# Patient Record
Sex: FEMALE | Race: BLACK OR AFRICAN AMERICAN | Employment: FULL TIME | URBAN - METROPOLITAN AREA
[De-identification: names, ages, dates, MRNs, and addresses within clinical notes are randomized per-mention and may not be internally consistent; named-entity substitution may affect disease eponyms.]

---

## 2019-07-23 PROBLEM — D21.9 FIBROIDS: Status: ACTIVE | Noted: 2019-07-23

## 2019-07-23 PROBLEM — N92.6 IRREGULAR MENSES: Status: ACTIVE | Noted: 2019-07-23

## 2019-07-23 PROBLEM — Z90.710 S/P LAPAROSCOPIC HYSTERECTOMY: Status: ACTIVE | Noted: 2019-07-23

## 2021-01-04 ENCOUNTER — HOSPITAL ENCOUNTER (OUTPATIENT)
Dept: PHYSICAL THERAPY | Age: 56
Discharge: HOME | End: 2021-01-04
Payer: COMMERCIAL

## 2021-01-04 PROCEDURE — 97110 THERAPEUTIC EXERCISES: CPT

## 2021-01-04 PROCEDURE — 97161 PT EVAL LOW COMPLEX 20 MIN: CPT

## 2021-01-04 NOTE — PROGRESS NOTES
PHYSICAL THERAPY - DAILY TREATMENT NOTE    Patient Name: Angelika Grove        Date: 2021  : 1965   yes Patient  Verified  Visit #:   1   of   8  Insurance: Payor: Blanche Prabhakar / Plan: [address] PT / Product Type: Commerical /      In time: 5:00 Out time: 6:00   Total Treatment Time: 60     Medicare/[facility] Time Tracking (below)   Total Timed Codes (min):  na 1:1 Treatment Time:  na     TREATMENT AREA =  Lumbar spondylosis [M47.816]    SUBJECTIVE  Pain Level (on 0 to 10 scale): 2  / 10   Medication Changes/New allergies or changes in medical history, any new surgeries or procedures?    no  If yes, update Summary List   Subjective Functional Status/Changes:  []  No changes reported     See POC          OBJECTIVE    12 min Therapeutic Exercise:  [x]  See flow sheet   Rationale:      increase ROM, increase strength and increase proprioception to improve the patients ability to complete ADLs     Billed With/As:   [x] TE   [] TA   [] Neuro   [] Self Care Patient Education: [x] Review HEP    [] Progressed/Changed HEP based on:   [] positioning   [] body mechanics   [] transfers   [] heat/ice application    [] other:      Other Objective/Functional Measures:    See POC     Post Treatment Pain Level (on 0 to 10) scale:   2   10     ASSESSMENT  Assessment/Changes in Function:     See POC     []  See Progress Note/Recertification   Patient will continue to benefit from skilled PT services to modify and progress therapeutic interventions, address functional mobility deficits, address ROM deficits, address strength deficits, analyze and address soft tissue restrictions, analyze and cue movement patterns, analyze and modify body mechanics/ergonomics, assess and modify postural abnormalities and instruct in home and community integration to attain remaining goals.    Progress toward goals / Updated goals:    See POC     PLAN  []  Upgrade activities as tolerated yes Continue plan of care   []  Discharge due to :    []  Other:      Therapist: Angela Newton, PT    Date: 1/4/2021 Time: 5:03 PM     No future appointments.
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
## 2021-01-06 ENCOUNTER — HOSPITAL ENCOUNTER (OUTPATIENT)
Dept: PHYSICAL THERAPY | Age: 56
Discharge: HOME | End: 2021-01-06
Payer: COMMERCIAL

## 2021-01-06 PROCEDURE — 97110 THERAPEUTIC EXERCISES: CPT

## 2021-01-06 NOTE — PROGRESS NOTES
PHYSICAL THERAPY - DAILY TREATMENT NOTE    Patient Name: Darrian Stewart        Date: 2021  : 1965   yes Patient  Verified  Visit #:   2   of   8  Insurance: Payor: Lazara Daniel / Plan: [address] PT / Product Type: Commerical /      In time: 3:30 Out time: 4:25   Total Treatment Time: 55     Medicare/[employer] Time Tracking (below)   Total Timed Codes (min):  na 1:1 Treatment Time:  na     TREATMENT AREA =  Lumbar spondylosis [M47.816]    SUBJECTIVE  Pain Level (on 0 to 10 scale):  3  / 10   Medication Changes/New allergies or changes in medical history, any new surgeries or procedures?    no  If yes, update Summary List   Subjective Functional Status/Changes:  []  No changes reported     Pt reports soreness from IE and HEP but no inc in pain. OBJECTIVE  Modalities Rationale:     decrease pain and increase tissue extensibility to improve patient's ability to complete ADLs   min [] Estim, type/location:                                      []  att     []  unatt     []  w/US     []  w/ice    []  w/heat    min []  Mechanical Traction: type/lbs                   []  pro   []  sup   []  int   []  cont    []  before manual    []  after manual    min []  Ultrasound, settings/location:      min []  Iontophoresis w/ dexamethasone, location:                                               []  take home patch       []  in clinic   10 min []  Ice     [x]  Heat    location/position:  To the l/s in prone    min []  Vasopneumatic Device, press/temp:     min []  Other:    [x] Skin assessment post-treatment (if applicable):    [x]  intact    []  redness- no adverse reaction     []redness  adverse reaction:        45 min Therapeutic Exercise:  [x]  See flow sheet   Rationale:      increase ROM, increase strength and improve coordination to improve the patients ability to tolerate prolonged standing     Billed With/As:   [x] TE   [] TA   [] Neuro   [] Self Care Patient Education: [x] Review HEP    [] Progressed/Changed HEP based on:   [] positioning   [] body mechanics   [] transfers   [] heat/ice application    [] other:      Other Objective/Functional Measures:    100% verbal cueing and visual demo for new exercises per flow     Post Treatment Pain Level (on 0 to 10) scale:   2  / 10     ASSESSMENT  Assessment/Changes in Function:     Chief complaint t/o session R HS cramping w/ core stability exercise, likely related to compensatory muscle activation 2' weakness. Pt reports compliance to initial HEP and reports no inc in pain post tx session. []  See Progress Note/Recertification   Patient will continue to benefit from skilled PT services to modify and progress therapeutic interventions, address functional mobility deficits, address ROM deficits, address strength deficits, analyze and address soft tissue restrictions, analyze and cue movement patterns, analyze and modify body mechanics/ergonomics, assess and modify postural abnormalities and instruct in home and community integration to attain remaining goals. Progress toward goals / Updated goals: · Short Term Goals: To be accomplished in  1  weeks:  1. Pt will be I and compliant with HEP for self management of sx. 1/6/2021: goal in progress, reports compliance w/ initial HEP  2. Pt will perform supine bridge to 100% w/ proper form to improve transfers and bed mobility  · Long Term Goals: To be accomplished in 4  weeks:  1. Pt will perform quadruped UE/LE diagonals while maintaining neutral spine to improve core stability for ADLs  2. Pt will note max daily pain </= 5/10 to improve ability to stand for housework and cooking  3. Pt will demo I w/ long-term HEP for self management of sx  4.  Improve FOTO score to >/= 59/100 to indicate improved function       PLAN  []  Upgrade activities as tolerated yes Continue plan of care   []  Discharge due to :    []  Other:      Therapist: Luis Aguilar PT    Date: 1/6/2021 Time: 3:59 PM     Future Appointments   Date Time Provider Danielle Arianne   1/11/2021  4:00 PM Alley Allison [facility]   1/14/2021  4:15 PM Lili Wolf [address] [facility]   1/18/2021  4:00 PM Alley Allison [facility]   1/20/2021  5:15 PM Harlon Foots, PTA [facility]   1/25/2021  3:15 PM Harlon Foots, PTA [facility]   1/27/2021  3:00 PM Harlon Foots, PTA [facility]

## 2021-01-11 ENCOUNTER — APPOINTMENT (OUTPATIENT)
Dept: PHYSICAL THERAPY | Age: 56
End: 2021-01-11
Payer: COMMERCIAL

## 2021-01-14 ENCOUNTER — HOSPITAL ENCOUNTER (OUTPATIENT)
Dept: PHYSICAL THERAPY | Age: 56
Discharge: HOME | End: 2021-01-14
Payer: COMMERCIAL

## 2021-01-14 PROCEDURE — 97110 THERAPEUTIC EXERCISES: CPT

## 2021-01-14 NOTE — PROGRESS NOTES
PHYSICAL THERAPY - DAILY TREATMENT NOTE    Patient Name: Mariajose Barfield        Date: 2021  : 1965   yes Patient  Verified  Visit #:   3  of   8  Insurance: Payor: Jose Thompson / [address] / Product Type: Commerical /      In time: 415 Out time: 504   Total Treatment Time: 49     Medicare/[employer] Time Tracking (below)   Total Timed Codes (min):  na 1:1 Treatment Time:  na     TREATMENT AREA =  Lumbar spondylosis [M47.816]    SUBJECTIVE  Pain Level (on 0 to 10 scale):  2  / 10   Medication Changes/New allergies or changes in medical history, any new surgeries or procedures?    no  If yes, update Summary List   Subjective Functional Status/Changes:  []  No changes reported     Pt reports she felt fine after her last session, her back is a little less painful today. OBJECTIVE  Modalities Rationale:     decrease pain and increase tissue extensibility to improve patient's ability to complete ADLs   min [] Estim, type/location:                                      []  att     []  unatt     []  w/US     []  w/ice    []  w/heat    min []  Mechanical Traction: type/lbs                   []  pro   []  sup   []  int   []  cont    []  before manual    []  after manual    min []  Ultrasound, settings/location:      min []  Iontophoresis w/ dexamethasone, location:                                               []  take home patch       []  in clinic   10 min []  Ice     [x]  Heat    location/position:  To the l/s in prone    min []  Vasopneumatic Device, press/temp:     min []  Other:    [x] Skin assessment post-treatment (if applicable):    [x]  intact    []  redness- no adverse reaction     []redness - adverse reaction:        39 min Therapeutic Exercise:  [x]  See flow sheet   Rationale:      increase ROM, increase strength and improve coordination to improve the patients ability to tolerate prolonged standing     Billed With/As:   [x] TE   [] TA   [] Neuro   [] Self Care Patient Education: [x] Review HEP    [] Progressed/Changed HEP based on:   [] positioning   [] body mechanics   [] transfers   [] heat/ice application    [] other:      Other Objective/Functional Measures: Added quad multi lift     Post Treatment Pain Level (on 0 to 10) scale:   2  / 10     ASSESSMENT  Assessment/Changes in Function:     Pt was able to tolerate addition of multifidus lift without complaint or inc in sx. Pt required vc and demo to perform correctly as well as tactile cues to dec lateral shift when performing L lift. Pt required 100% vc with all exercises to perform correctly. PT considering adding paloff press and/or modified planks as tolerated. []  See Progress Note/Recertification   Patient will continue to benefit from skilled PT services to modify and progress therapeutic interventions, address functional mobility deficits, address ROM deficits, address strength deficits, analyze and address soft tissue restrictions, analyze and cue movement patterns, analyze and modify body mechanics/ergonomics, assess and modify postural abnormalities and instruct in home and community integration to attain remaining goals. Progress toward goals / Updated goals: · Short Term Goals: To be accomplished in  1  weeks:  1. Pt will be I and compliant with HEP for self management of sx. 1/6/2021: goal in progress, reports compliance w/ initial HEP  2. Pt will perform supine bridge to 100% w/ proper form to improve transfers and bed mobility  · Long Term Goals: To be accomplished in 4  weeks:  1. Pt will perform quadruped UE/LE diagonals while maintaining neutral spine to improve core stability for ADLs added quad multi lift today 1/14/21  2. Pt will note max daily pain </= 5/10 to improve ability to stand for housework and cooking  3. Pt will demo I w/ long-term HEP for self management of sx  4.  Improve FOTO score to >/= 59/100 to indicate improved function       PLAN  []  Upgrade activities as tolerated yes Continue plan of care   []  Discharge due to :    []  Other:      Therapist: Lin Garcia    Date: 1/14/2021 Time: 3:59 PM     Future Appointments   Date Time Provider Danielle Acuña   1/14/2021  4:15 PM Margo Orris [facility]   1/18/2021  4:00 PM Walterine Mix, PTA [facility]   1/20/2021  5:15 PM Walterine Mix, PTA [facility]   1/25/2021  3:15 PM Walterine Mix, PTA [facility]   1/27/2021  3:00 PM Walterine Mix, PTA [facility]

## 2021-01-18 ENCOUNTER — APPOINTMENT (OUTPATIENT)
Dept: PHYSICAL THERAPY | Age: 56
End: 2021-01-18
Payer: COMMERCIAL

## 2021-01-20 ENCOUNTER — APPOINTMENT (OUTPATIENT)
Dept: PHYSICAL THERAPY | Age: 56
End: 2021-01-20
Payer: COMMERCIAL

## 2021-01-25 ENCOUNTER — APPOINTMENT (OUTPATIENT)
Dept: PHYSICAL THERAPY | Age: 56
End: 2021-01-25
Payer: COMMERCIAL

## 2021-01-27 ENCOUNTER — APPOINTMENT (OUTPATIENT)
Dept: PHYSICAL THERAPY | Age: 56
End: 2021-01-27
Payer: COMMERCIAL

## 2021-03-11 NOTE — PROGRESS NOTES
[address] [facility] AT [address], [address] [place] [address]  Phone: [phone]   [phone]    DISCHARGE NOTE  Patient Name: Flavio Chaparro : 1965   Treatment/Medical Diagnosis: Lumbar spondylosis [P60.955]   Referral Source: Eris Ford MD     Date of Initial Visit: 2021 Attended Visits: 3 Missed Visits: 2       SUMMARY OF TREATMENT  Pt attended initial evaluation and 2 follow-up appointments. Unfortunately, patient failed to return for further treatment and is therefore discharged from our care at this time. A formal reassessment of goals was unable to be performed due to unplanned DC. RECOMMENDATIONS  Discontinue physical therapy due to patient not returning. If you have any questions/comments please contact us directly at [phone]. Thank you for allowing us to assist in the care of your patient.     Therapist Signature: Tyler Caraballo PT Date: 3/11/2021     Time: 12:57 PM